# Patient Record
Sex: MALE | HISPANIC OR LATINO
[De-identification: names, ages, dates, MRNs, and addresses within clinical notes are randomized per-mention and may not be internally consistent; named-entity substitution may affect disease eponyms.]

---

## 2017-01-04 ENCOUNTER — APPOINTMENT (OUTPATIENT)
Dept: PAIN MANAGEMENT | Facility: CLINIC | Age: 52
End: 2017-01-04

## 2017-01-04 ENCOUNTER — MEDICATION RENEWAL (OUTPATIENT)
Age: 52
End: 2017-01-04

## 2017-01-06 ENCOUNTER — APPOINTMENT (OUTPATIENT)
Age: 52
End: 2017-01-06

## 2017-01-06 VITALS
BODY MASS INDEX: 28.31 KG/M2 | HEIGHT: 70.5 IN | WEIGHT: 200 LBS | HEART RATE: 64 BPM | SYSTOLIC BLOOD PRESSURE: 121 MMHG | DIASTOLIC BLOOD PRESSURE: 83 MMHG

## 2017-02-10 ENCOUNTER — MEDICATION RENEWAL (OUTPATIENT)
Age: 52
End: 2017-02-10

## 2017-03-09 ENCOUNTER — RX RENEWAL (OUTPATIENT)
Age: 52
End: 2017-03-09

## 2017-03-09 ENCOUNTER — MEDICATION RENEWAL (OUTPATIENT)
Age: 52
End: 2017-03-09

## 2017-04-05 ENCOUNTER — APPOINTMENT (OUTPATIENT)
Dept: PAIN MANAGEMENT | Facility: CLINIC | Age: 52
End: 2017-04-05

## 2017-04-05 VITALS
DIASTOLIC BLOOD PRESSURE: 86 MMHG | SYSTOLIC BLOOD PRESSURE: 134 MMHG | WEIGHT: 205 LBS | HEART RATE: 63 BPM | BODY MASS INDEX: 29.02 KG/M2 | HEIGHT: 70.5 IN

## 2017-04-12 ENCOUNTER — MEDICATION RENEWAL (OUTPATIENT)
Age: 52
End: 2017-04-12

## 2017-05-02 ENCOUNTER — MEDICATION RENEWAL (OUTPATIENT)
Age: 52
End: 2017-05-02

## 2017-05-02 ENCOUNTER — OTHER (OUTPATIENT)
Age: 52
End: 2017-05-02

## 2017-06-27 ENCOUNTER — MEDICATION RENEWAL (OUTPATIENT)
Age: 52
End: 2017-06-27

## 2017-08-02 ENCOUNTER — APPOINTMENT (OUTPATIENT)
Dept: PAIN MANAGEMENT | Facility: CLINIC | Age: 52
End: 2017-08-02
Payer: COMMERCIAL

## 2017-08-02 VITALS
WEIGHT: 197 LBS | SYSTOLIC BLOOD PRESSURE: 115 MMHG | HEIGHT: 70.5 IN | DIASTOLIC BLOOD PRESSURE: 75 MMHG | HEART RATE: 65 BPM | BODY MASS INDEX: 27.89 KG/M2

## 2017-08-02 PROCEDURE — 99214 OFFICE O/P EST MOD 30 MIN: CPT

## 2017-08-29 ENCOUNTER — MEDICATION RENEWAL (OUTPATIENT)
Age: 52
End: 2017-08-29

## 2017-09-25 ENCOUNTER — MEDICATION RENEWAL (OUTPATIENT)
Age: 52
End: 2017-09-25

## 2017-10-05 ENCOUNTER — MEDICATION RENEWAL (OUTPATIENT)
Age: 52
End: 2017-10-05

## 2017-11-07 ENCOUNTER — APPOINTMENT (OUTPATIENT)
Dept: PAIN MANAGEMENT | Facility: CLINIC | Age: 52
End: 2017-11-07
Payer: COMMERCIAL

## 2017-11-07 VITALS
SYSTOLIC BLOOD PRESSURE: 133 MMHG | BODY MASS INDEX: 28.17 KG/M2 | HEIGHT: 70.5 IN | DIASTOLIC BLOOD PRESSURE: 86 MMHG | WEIGHT: 199 LBS | HEART RATE: 59 BPM

## 2017-11-07 PROCEDURE — 99213 OFFICE O/P EST LOW 20 MIN: CPT

## 2017-12-08 ENCOUNTER — MEDICATION RENEWAL (OUTPATIENT)
Age: 52
End: 2017-12-08

## 2018-01-05 ENCOUNTER — MOBILE ON CALL (OUTPATIENT)
Age: 53
End: 2018-01-05

## 2018-01-29 ENCOUNTER — RX RENEWAL (OUTPATIENT)
Age: 53
End: 2018-01-29

## 2018-02-01 ENCOUNTER — MEDICATION RENEWAL (OUTPATIENT)
Age: 53
End: 2018-02-01

## 2018-02-26 ENCOUNTER — APPOINTMENT (OUTPATIENT)
Dept: PAIN MANAGEMENT | Facility: CLINIC | Age: 53
End: 2018-02-26
Payer: COMMERCIAL

## 2018-02-26 VITALS
HEIGHT: 70.5 IN | SYSTOLIC BLOOD PRESSURE: 141 MMHG | BODY MASS INDEX: 28.6 KG/M2 | WEIGHT: 202 LBS | DIASTOLIC BLOOD PRESSURE: 92 MMHG | HEART RATE: 68 BPM

## 2018-02-26 PROCEDURE — 99213 OFFICE O/P EST LOW 20 MIN: CPT

## 2018-03-28 ENCOUNTER — RX RENEWAL (OUTPATIENT)
Age: 53
End: 2018-03-28

## 2018-04-06 ENCOUNTER — MEDICATION RENEWAL (OUTPATIENT)
Age: 53
End: 2018-04-06

## 2018-04-25 ENCOUNTER — RX RENEWAL (OUTPATIENT)
Age: 53
End: 2018-04-25

## 2018-05-02 ENCOUNTER — RX RENEWAL (OUTPATIENT)
Age: 53
End: 2018-05-02

## 2018-05-02 ENCOUNTER — MEDICATION RENEWAL (OUTPATIENT)
Age: 53
End: 2018-05-02

## 2018-05-17 ENCOUNTER — APPOINTMENT (OUTPATIENT)
Dept: PAIN MANAGEMENT | Facility: CLINIC | Age: 53
End: 2018-05-17
Payer: COMMERCIAL

## 2018-05-17 VITALS
WEIGHT: 202 LBS | HEART RATE: 66 BPM | HEIGHT: 70.5 IN | BODY MASS INDEX: 28.6 KG/M2 | SYSTOLIC BLOOD PRESSURE: 146 MMHG | DIASTOLIC BLOOD PRESSURE: 86 MMHG

## 2018-05-17 PROCEDURE — 99213 OFFICE O/P EST LOW 20 MIN: CPT

## 2018-06-26 ENCOUNTER — RX RENEWAL (OUTPATIENT)
Age: 53
End: 2018-06-26

## 2018-06-29 ENCOUNTER — MESSAGE (OUTPATIENT)
Age: 53
End: 2018-06-29

## 2018-07-26 ENCOUNTER — MEDICATION RENEWAL (OUTPATIENT)
Age: 53
End: 2018-07-26

## 2018-08-15 ENCOUNTER — MEDICATION RENEWAL (OUTPATIENT)
Age: 53
End: 2018-08-15

## 2018-08-15 ENCOUNTER — APPOINTMENT (OUTPATIENT)
Dept: PAIN MANAGEMENT | Facility: CLINIC | Age: 53
End: 2018-08-15
Payer: COMMERCIAL

## 2018-08-15 VITALS
SYSTOLIC BLOOD PRESSURE: 164 MMHG | BODY MASS INDEX: 28.92 KG/M2 | HEART RATE: 65 BPM | WEIGHT: 202 LBS | DIASTOLIC BLOOD PRESSURE: 82 MMHG | HEIGHT: 70 IN

## 2018-08-15 PROCEDURE — 99214 OFFICE O/P EST MOD 30 MIN: CPT

## 2018-09-17 ENCOUNTER — MEDICATION RENEWAL (OUTPATIENT)
Age: 53
End: 2018-09-17

## 2018-10-19 ENCOUNTER — RX RENEWAL (OUTPATIENT)
Age: 53
End: 2018-10-19

## 2018-10-30 ENCOUNTER — MEDICATION RENEWAL (OUTPATIENT)
Age: 53
End: 2018-10-30

## 2018-11-02 ENCOUNTER — RX RENEWAL (OUTPATIENT)
Age: 53
End: 2018-11-02

## 2018-11-12 ENCOUNTER — APPOINTMENT (OUTPATIENT)
Dept: PAIN MANAGEMENT | Facility: CLINIC | Age: 53
End: 2018-11-12
Payer: COMMERCIAL

## 2018-11-12 VITALS
HEIGHT: 70 IN | WEIGHT: 200 LBS | DIASTOLIC BLOOD PRESSURE: 84 MMHG | HEART RATE: 60 BPM | SYSTOLIC BLOOD PRESSURE: 138 MMHG | BODY MASS INDEX: 28.63 KG/M2

## 2018-11-12 PROCEDURE — 99213 OFFICE O/P EST LOW 20 MIN: CPT

## 2018-12-12 ENCOUNTER — MEDICATION RENEWAL (OUTPATIENT)
Age: 53
End: 2018-12-12

## 2018-12-12 ENCOUNTER — RX RENEWAL (OUTPATIENT)
Age: 53
End: 2018-12-12

## 2018-12-26 ENCOUNTER — MESSAGE (OUTPATIENT)
Age: 53
End: 2018-12-26

## 2019-01-11 ENCOUNTER — MEDICATION RENEWAL (OUTPATIENT)
Age: 54
End: 2019-01-11

## 2019-02-05 ENCOUNTER — APPOINTMENT (OUTPATIENT)
Dept: PAIN MANAGEMENT | Facility: CLINIC | Age: 54
End: 2019-02-05
Payer: COMMERCIAL

## 2019-02-05 VITALS
BODY MASS INDEX: 27.2 KG/M2 | HEIGHT: 70 IN | DIASTOLIC BLOOD PRESSURE: 85 MMHG | SYSTOLIC BLOOD PRESSURE: 145 MMHG | HEART RATE: 72 BPM | WEIGHT: 190 LBS

## 2019-02-05 PROCEDURE — 99213 OFFICE O/P EST LOW 20 MIN: CPT

## 2019-02-05 NOTE — ASSESSMENT
[Opioids] : Patient was explained in detail about pain control by using opioids. Patient has signed and fully understands our guidelines for medication and drug screening.  Patient understands the side effects of opioids, including, but not limited to, drug tolerance, dependence, potential for addiction. This class of drugs is habit-forming and EHSAN regulated. The sedative effects of opioids can be potentiated by taking alcohol or any sleeping pills, along with opioids. The decision to drive is patient’s responsibility, as opioids can affect his/her driving ability and ability to concentrate. The long-term place is not clear, however, patient understands that once the pain control optimizes, the goal will be to wean off the opioids. All the issues regarding opioid treatment have been addressed satisfactorily.

## 2019-02-07 NOTE — DISCUSSION/SUMMARY
[Opioids] : Patient was explained in detail about pain control by using opioids. Patient has signed and fully understands our guidelines for medication and drug screening.  Patient understands the side effects of opioids, including, but not limited to, drug tolerance, dependence, potential for addiction. This class of drugs is habit-forming and EHSAN regulated. The sedative effects of opioids can be potentiated by taking alcohol or any sleeping pills, along with opioids. The decision to drive is patient’s responsibility, as opioids can affect his/her driving ability and ability to concentrate. The long-term place is not clear, however, patient understands that once the pain control optimizes, the goal will be to wean off the opioids. All the issues regarding opioid treatment have been addressed satisfactorily.  [FreeTextEntry1] : His istop was checked # [id number].  No signs of toxicity.  Will continue to monitor. His prescriptions were renewed without changes.  He was reminded of the risks of long term opioid use. \par \par He will call his pcp for an appt today.  The importance of doing so re-inforced. \par \par He appears to be using his medications both reasonably and responsibly.  His medication dosages and schedules were reviewed and appropriate.  We reviewed his opiate agreement and discussed locked box for his medications.   He shows no signs of aberrant behavior.  \par \par He will RTO in two months time for reevaluation.  \par

## 2019-02-07 NOTE — PHYSICAL EXAM
[General Appearance - Alert] : alert [General Appearance - In No Acute Distress] : in no acute distress [General Appearance - Well Nourished] : well nourished [General Appearance - Well Developed] : well developed [General Appearance - Well-Appearing] : healthy appearing [Oriented To Time, Place, And Person] : oriented to person, place, and time [Impaired Insight] : insight and judgment were intact [Affect] : the affect was normal [Mood] : the mood was normal [Memory Recent] : recent memory was not impaired [Memory Remote] : remote memory was not impaired [Motor Strength] : muscle strength was normal in all four extremities [No Muscle Atrophy] : normal bulk in all four extremities [Sensation Tactile Decrease] : light touch was intact [Balance] : balance was intact [Respiration, Rhythm And Depth] : normal respiratory rhythm and effort [Exaggerated Use Of Accessory Muscles For Inspiration] : no accessory muscle use [Abdomen Soft] : soft [Abdomen Tenderness] : non-tender [Abdomen Mass (___ Cm)] : no abdominal mass palpated [No Spinal Tenderness] : no spinal tenderness [Abnormal Walk] : normal gait [Nail Clubbing] : no clubbing  or cyanosis of the fingernails [Involuntary Movements] : no involuntary movements were seen [Musculoskeletal - Swelling] : no joint swelling seen [Motor Tone] : muscle strength and tone were normal [Skin Color & Pigmentation] : normal skin color and pigmentation [] : no rash [Skin Lesions] : no skin lesions [FreeTextEntry8] : no assistive device

## 2019-02-07 NOTE — HISTORY OF PRESENT ILLNESS
[FreeTextEntry1] : The patient returned today for a follow up ov.  He was alone today.  He reports that his pain continues to be controlled, both decrease pain and increase functioning with his medication.  VAS=6/10 with his medications.\par \par He is continues to work full time-no missed work days. \par \par He denies side effects to his medications- BM regular. \par \par /85.  \par \par He fully denies depression and suicidal ideations upon full questioning.  \par \par He is complaining of fatigue and weight loss over the past couple of months.  He states that he has never been below 200 lbs since [employer].  He has not seen his pcp yet.  No other medical complaints.

## 2019-03-04 ENCOUNTER — MEDICATION RENEWAL (OUTPATIENT)
Age: 54
End: 2019-03-04

## 2019-04-01 ENCOUNTER — RX RENEWAL (OUTPATIENT)
Age: 54
End: 2019-04-01

## 2019-04-02 ENCOUNTER — RX RENEWAL (OUTPATIENT)
Age: 54
End: 2019-04-02

## 2019-04-04 ENCOUNTER — MEDICATION RENEWAL (OUTPATIENT)
Age: 54
End: 2019-04-04

## 2019-04-30 ENCOUNTER — APPOINTMENT (OUTPATIENT)
Dept: PAIN MANAGEMENT | Facility: CLINIC | Age: 54
End: 2019-04-30
Payer: COMMERCIAL

## 2019-04-30 VITALS
HEART RATE: 61 BPM | SYSTOLIC BLOOD PRESSURE: 104 MMHG | BODY MASS INDEX: 27.2 KG/M2 | WEIGHT: 190 LBS | HEIGHT: 70 IN | DIASTOLIC BLOOD PRESSURE: 64 MMHG

## 2019-04-30 PROCEDURE — 99213 OFFICE O/P EST LOW 20 MIN: CPT

## 2019-05-02 NOTE — DISCUSSION/SUMMARY
[Opioids] : Patient was explained in detail about pain control by using opioids. Patient has signed and fully understands our guidelines for medication and drug screening.  Patient understands the side effects of opioids, including, but not limited to, drug tolerance, dependence, potential for addiction. This class of drugs is habit-forming and EHSAN regulated. The sedative effects of opioids can be potentiated by taking alcohol or any sleeping pills, along with opioids. The decision to drive is patient’s responsibility, as opioids can affect his/her driving ability and ability to concentrate. The long-term place is not clear, however, patient understands that once the pain control optimizes, the goal will be to wean off the opioids. All the issues regarding opioid treatment have been addressed satisfactorily.  [FreeTextEntry1] : Istop checked (#[id number]).  No signs of toxicity.  Will continue to monitor. His prescriptions were renewed. He was reminded of the risks of long term opioid use.  We also discussed other alternatives which he defers at this jayce.   \par \par He was reminded to f/u with his pcp due to recent weight loss.   \par \par No aberrant behavior noted.  He appears to be using his medications both reasonably and responsibly.  We reviewed his opiate agreement and discussed locked box for his medications. No reported side effects-bm regular.  \par \par He will RTO in three months time for reevaluation.  \par

## 2019-05-02 NOTE — HISTORY OF PRESENT ILLNESS
[FreeTextEntry1] : The patient returned today for a follow up ov.  His pain continues to be under control with his current medication regimen.  He is requesting renewals.  VAS=5-6/10 with his medications, providing both decrease pain and increase functioning.\par \par He works full time and has no missed work days. \par \par /64.  \par \par He fully denies depression and suicidal ideations upon full questioning.  \par \par No other medical complaints.

## 2019-05-02 NOTE — REASON FOR VISIT
[Follow-Up: _____] : a [unfilled] follow-up visit [FreeTextEntry1] : chronic bilateral lower back pain/buttocks pain with radiation to bilateral legs with prolonged sit.

## 2019-05-29 ENCOUNTER — MEDICATION RENEWAL (OUTPATIENT)
Age: 54
End: 2019-05-29

## 2019-05-31 ENCOUNTER — MEDICATION RENEWAL (OUTPATIENT)
Age: 54
End: 2019-05-31

## 2019-06-25 ENCOUNTER — RX RENEWAL (OUTPATIENT)
Age: 54
End: 2019-06-25

## 2019-06-26 ENCOUNTER — RX RENEWAL (OUTPATIENT)
Age: 54
End: 2019-06-26

## 2019-07-03 ENCOUNTER — MEDICATION RENEWAL (OUTPATIENT)
Age: 54
End: 2019-07-03

## 2019-07-08 ENCOUNTER — MEDICATION RENEWAL (OUTPATIENT)
Age: 54
End: 2019-07-08

## 2019-07-29 ENCOUNTER — APPOINTMENT (OUTPATIENT)
Dept: PAIN MANAGEMENT | Facility: CLINIC | Age: 54
End: 2019-07-29
Payer: COMMERCIAL

## 2019-07-29 VITALS
DIASTOLIC BLOOD PRESSURE: 72 MMHG | WEIGHT: 185 LBS | HEART RATE: 65 BPM | SYSTOLIC BLOOD PRESSURE: 119 MMHG | BODY MASS INDEX: 26.48 KG/M2 | HEIGHT: 70 IN

## 2019-07-29 PROCEDURE — 99213 OFFICE O/P EST LOW 20 MIN: CPT

## 2019-08-26 ENCOUNTER — MEDICATION RENEWAL (OUTPATIENT)
Age: 54
End: 2019-08-26

## 2019-08-29 ENCOUNTER — MEDICATION RENEWAL (OUTPATIENT)
Age: 54
End: 2019-08-29

## 2019-10-22 ENCOUNTER — APPOINTMENT (OUTPATIENT)
Dept: PAIN MANAGEMENT | Facility: CLINIC | Age: 54
End: 2019-10-22
Payer: COMMERCIAL

## 2019-10-22 VITALS
DIASTOLIC BLOOD PRESSURE: 64 MMHG | WEIGHT: 187 LBS | HEIGHT: 70 IN | SYSTOLIC BLOOD PRESSURE: 113 MMHG | HEART RATE: 66 BPM | BODY MASS INDEX: 26.77 KG/M2

## 2019-10-22 PROCEDURE — 99213 OFFICE O/P EST LOW 20 MIN: CPT

## 2019-10-29 NOTE — DISCUSSION/SUMMARY
[Opioids] : Patient was explained in detail about pain control by using opioids. Patient has signed and fully understands our guidelines for medication and drug screening.  Patient understands the side effects of opioids, including, but not limited to, drug tolerance, dependence, potential for addiction. This class of drugs is habit-forming and EHSAN regulated. The sedative effects of opioids can be potentiated by taking alcohol or any sleeping pills, along with opioids. The decision to drive is patient’s responsibility, as opioids can affect his/her driving ability and ability to concentrate. The long-term place is not clear, however, patient understands that once the pain control optimizes, the goal will be to wean off the opioids. All the issues regarding opioid treatment have been addressed satisfactorily.  [FreeTextEntry1] : [facility] registry reviewed (#[id number]). No signs of toxicity.  Will continue to monitor. His prescriptions were renewed without changes for now.  I reviewed the risks of long term opioid use.   \par \par He will continue to pursue the CT scan of his lungs to rule out asbestosis exposure as ordered by his pcp.   \par \par No aberrant behavior noted.  He appears to be using his medications both reasonably and responsibly.  We reviewed his opiate agreement and discussed locked box for his medications. No reported side effects.  \par \par He will RTO in three months time for reevaluation.  \par \par Dr. Hall on site.  Billed incident to the service.

## 2019-10-29 NOTE — HISTORY OF PRESENT ILLNESS
[FreeTextEntry1] : Mr. Conway returned today for a follow up office visit.  He came alone to his appointment today. He continues under our care for treatment of his chronic lower back pain with radiation to his bilateral legs.     He states that his pain has been relatively stable, responds to his pain medications.  That is he continues to receive decrease pain and increase functioning.  VAS=4-6/10 on average with his medications. \par \par He works full time and has no missed work days. \par \par /64. He has had unintentional weight loss.  He was seen by his pcp for physical due to his weight loss.  He states that labs were drawn and his pcp requested a CT scan of his lungs to rule out asbestos exposure.  However, his insurance carrier will not authorize it.  He did gain 2 lbs since his last office visit with me in July.  Given his recent weight loss and his history of construction work I feel that the CT Scan is medically necessary.    \par \par No complaints of depression/suicidal thoughts.  \par \par No other medical complaints.

## 2019-11-18 ENCOUNTER — MEDICATION RENEWAL (OUTPATIENT)
Age: 54
End: 2019-11-18

## 2019-12-16 ENCOUNTER — MEDICATION RENEWAL (OUTPATIENT)
Age: 54
End: 2019-12-16

## 2019-12-18 ENCOUNTER — MEDICATION RENEWAL (OUTPATIENT)
Age: 54
End: 2019-12-18

## 2019-12-19 ENCOUNTER — MEDICATION RENEWAL (OUTPATIENT)
Age: 54
End: 2019-12-19

## 2020-01-10 ENCOUNTER — MEDICATION RENEWAL (OUTPATIENT)
Age: 55
End: 2020-01-10

## 2020-01-10 RX ORDER — TIZANIDINE HYDROCHLORIDE 4 MG/1
4 CAPSULE ORAL
Qty: 180 | Refills: 2 | Status: DISCONTINUED | COMMUNITY
Start: 2018-11-12 | End: 2020-01-10

## 2020-02-10 ENCOUNTER — APPOINTMENT (OUTPATIENT)
Dept: PAIN MANAGEMENT | Facility: CLINIC | Age: 55
End: 2020-02-10
Payer: COMMERCIAL

## 2020-02-10 VITALS
HEART RATE: 65 BPM | BODY MASS INDEX: 27.92 KG/M2 | HEIGHT: 70 IN | DIASTOLIC BLOOD PRESSURE: 90 MMHG | SYSTOLIC BLOOD PRESSURE: 138 MMHG | WEIGHT: 195 LBS

## 2020-02-10 DIAGNOSIS — R52 PAIN, UNSPECIFIED: ICD-10-CM

## 2020-02-10 PROCEDURE — 99213 OFFICE O/P EST LOW 20 MIN: CPT

## 2020-02-12 NOTE — PHYSICAL EXAM
[General Appearance - Alert] : alert [General Appearance - In No Acute Distress] : in no acute distress [General Appearance - Well-Appearing] : healthy appearing [General Appearance - Well Nourished] : well nourished [General Appearance - Well Developed] : well developed [Impaired Insight] : insight and judgment were intact [Oriented To Time, Place, And Person] : oriented to person, place, and time [Memory Remote] : remote memory was not impaired [Affect] : the affect was normal [Memory Recent] : recent memory was not impaired [Mood] : the mood was normal [Motor Strength] : muscle strength was normal in all four extremities [Sensation Tactile Decrease] : light touch was intact [No Muscle Atrophy] : normal bulk in all four extremities [Balance] : balance was intact [FreeTextEntry8] : no assistive device [Respiration, Rhythm And Depth] : normal respiratory rhythm and effort [Exaggerated Use Of Accessory Muscles For Inspiration] : no accessory muscle use [Abdomen Tenderness] : non-tender [Abdomen Mass (___ Cm)] : no abdominal mass palpated [Abdomen Soft] : soft [No Spinal Tenderness] : no spinal tenderness [Abnormal Walk] : normal gait [Nail Clubbing] : no clubbing  or cyanosis of the fingernails [Musculoskeletal - Swelling] : no joint swelling seen [Motor Tone] : muscle strength and tone were normal [Involuntary Movements] : no involuntary movements were seen [Skin Color & Pigmentation] : normal skin color and pigmentation [Skin Lesions] : no skin lesions [] : no rash

## 2020-02-12 NOTE — HISTORY OF PRESENT ILLNESS
[FreeTextEntry1] : Mr. Conway returned today for a follow up office visit.  He came alone to his appointment today. He continues under our care for treatment of his chronic lower back pain with radiation to his bilateral legs.     He states that his pain has been worse lately.  He hurts all over.  He states that he was bit by a spider or a tick several months back when he was at his [place] [facility].  He feels that ever since his pain came on gradually.  VAS=6-8/10 due to body pain. \par \par He works full time and has no missed work days. \par \par /90. He has also had unintentional weight loss. He is going to call his pcp to follow up for labs today as well. \par \par No complaints of depression/suicidal thoughts.  \par \par No other medical complaints.

## 2020-02-12 NOTE — DISCUSSION/SUMMARY
[FreeTextEntry1] :  registry reviewed. No signs of toxicity.  Will continue to monitor. His prescriptions were renewed without changes for now.  I reviewed the risks of long term opioid use.   \par \par Labs ordered.  He will call his pcp today to schedule an appt so that he can see if additional tests are needed. .  \par \par No aberrant behavior noted.  He appears to be using his medications both reasonably and responsibly.  We reviewed his opiate agreement and discussed locked box for his medications. No reported side effects.  \par \par He will RTO in three months time for reevaluation.  \par \par Dr. Hall on site.  Billed incident to the service.  [Opioids] : Patient was explained in detail about pain control by using opioids. Patient has signed and fully understands our guidelines for medication and drug screening.  Patient understands the side effects of opioids, including, but not limited to, drug tolerance, dependence, potential for addiction. This class of drugs is habit-forming and EHSAN regulated. The sedative effects of opioids can be potentiated by taking alcohol or any sleeping pills, along with opioids. The decision to drive is patient’s responsibility, as opioids can affect his/her driving ability and ability to concentrate. The long-term place is not clear, however, patient understands that once the pain control optimizes, the goal will be to wean off the opioids. All the issues regarding opioid treatment have been addressed satisfactorily.

## 2020-05-01 ENCOUNTER — APPOINTMENT (OUTPATIENT)
Dept: PAIN MANAGEMENT | Facility: CLINIC | Age: 55
End: 2020-05-01

## 2020-06-25 ENCOUNTER — APPOINTMENT (OUTPATIENT)
Dept: PAIN MANAGEMENT | Facility: CLINIC | Age: 55
End: 2020-06-25
Payer: COMMERCIAL

## 2020-06-25 VITALS
SYSTOLIC BLOOD PRESSURE: 120 MMHG | BODY MASS INDEX: 27.92 KG/M2 | HEIGHT: 70 IN | WEIGHT: 195 LBS | HEART RATE: 67 BPM | DIASTOLIC BLOOD PRESSURE: 78 MMHG

## 2020-06-25 VITALS — TEMPERATURE: 97.2 F

## 2020-06-25 PROCEDURE — 99213 OFFICE O/P EST LOW 20 MIN: CPT

## 2020-06-25 NOTE — REVIEW OF SYSTEMS
[Fever] : no fever [Chills] : no chills [Chest Pain] : no chest pain [Palpitations] : no palpitations [Shortness Of Breath] : no shortness of breath [Back Pain] : ~T back pain [Depression] : no depression

## 2020-06-25 NOTE — ASSESSMENT
[FreeTextEntry1] : UDS today . \par Pt does not need a refill today ( it was sent in on 6/19/2020).\par RTo 2 months. \par DR Hall on site today.  [Opioids] : Patient was explained in detail about pain control by using opioids. Patient has signed and fully understands our guidelines for medication and drug screening.  Patient understands the side effects of opioids, including, but not limited to, drug tolerance, dependence, potential for addiction. This class of drugs is habit-forming and EHSAN regulated. The sedative effects of opioids can be potentiated by taking alcohol or any sleeping pills, along with opioids. The decision to drive is patient’s responsibility, as opioids can affect his/her driving ability and ability to concentrate. The long-term place is not clear, however, patient understands that once the pain control optimizes, the goal will be to wean off the opioids. All the issues regarding opioid treatment have been addressed satisfactorily.

## 2020-09-23 ENCOUNTER — APPOINTMENT (OUTPATIENT)
Dept: PAIN MANAGEMENT | Facility: CLINIC | Age: 55
End: 2020-09-23
Payer: COMMERCIAL

## 2020-09-23 VITALS
HEIGHT: 70 IN | WEIGHT: 190 LBS | SYSTOLIC BLOOD PRESSURE: 121 MMHG | BODY MASS INDEX: 27.2 KG/M2 | DIASTOLIC BLOOD PRESSURE: 78 MMHG | HEART RATE: 66 BPM | TEMPERATURE: 97.2 F

## 2020-09-23 PROCEDURE — 99213 OFFICE O/P EST LOW 20 MIN: CPT

## 2020-09-23 NOTE — ASSESSMENT
[FreeTextEntry1] : ISTOP # [id number].\par Next appt should be with Dr Hall . \par \par \par Dr Hall on site , billed incident to service.  [Opioids] : Patient was explained in detail about pain control by using opioids. Patient has signed and fully understands our guidelines for medication and drug screening.  Patient understands the side effects of opioids, including, but not limited to, drug tolerance, dependence, potential for addiction. This class of drugs is habit-forming and EHSAN regulated. The sedative effects of opioids can be potentiated by taking alcohol or any sleeping pills, along with opioids. The decision to drive is patient’s responsibility, as opioids can affect his/her driving ability and ability to concentrate. The long-term place is not clear, however, patient understands that once the pain control optimizes, the goal will be to wean off the opioids. All the issues regarding opioid treatment have been addressed satisfactorily.

## 2020-09-23 NOTE — PHYSICAL EXAM
[General Appearance - Alert] : alert [General Appearance - In No Acute Distress] : in no acute distress [General Appearance - Well-Appearing] : healthy appearing [] : normal voice and communication [Oriented To Time, Place, And Person] : oriented to person, place, and time [Affect] : the affect was normal [Mood] : the mood was normal [Motor Strength] : muscle strength was normal in all four extremities [Motor Strength Lower Extremities Bilaterally] : strength was normal in both lower extremities [Sclera] : the sclera and conjunctiva were normal

## 2020-09-23 NOTE — HISTORY OF PRESENT ILLNESS
[FreeTextEntry1] : Patient returns today for a follow up visit. \par He continues to have lower back pain .\par He is taking Vicodin . No signs of toxicity noted .\par UDS reviewed = positive for THC - pt educated about dangers of of non- medical marijuana. Ot advised to stop using marijuana. \par \par Pt denies alcohol use . Pt denies depression . He reports he keep medication in a sake location. \par \par

## 2020-11-18 ENCOUNTER — RX RENEWAL (OUTPATIENT)
Age: 55
End: 2020-11-18

## 2020-12-12 NOTE — REASON FOR VISIT
Yes... [Follow-Up: _____] : a [unfilled] follow-up visit [FreeTextEntry1] : chronic bilateral lower back pain/buttocks pain with radiation to bilateral legs with prolonged sit.

## 2020-12-15 ENCOUNTER — APPOINTMENT (OUTPATIENT)
Dept: PAIN MANAGEMENT | Facility: CLINIC | Age: 55
End: 2020-12-15
Payer: COMMERCIAL

## 2020-12-15 VITALS
WEIGHT: 190 LBS | HEIGHT: 70 IN | BODY MASS INDEX: 27.2 KG/M2 | HEART RATE: 58 BPM | DIASTOLIC BLOOD PRESSURE: 79 MMHG | SYSTOLIC BLOOD PRESSURE: 144 MMHG

## 2020-12-15 PROCEDURE — 99214 OFFICE O/P EST MOD 30 MIN: CPT

## 2020-12-15 PROCEDURE — 99072 ADDL SUPL MATRL&STAF TM PHE: CPT

## 2020-12-15 NOTE — HISTORY OF PRESENT ILLNESS
[FreeTextEntry1] : Patient reports vicoprofen is almost like taking ibupofen alone. Last took this am. No new medical problems.\par Went for MRI last week of LS spine

## 2020-12-15 NOTE — ASSESSMENT
[FreeTextEntry1] : Chronic LS radiculopathy\par \par Will lori we  Vicoprofen to 3 p daty and re evaluate.\par Pending MRI results\par ISTOP and UDT

## 2021-02-08 ENCOUNTER — APPOINTMENT (OUTPATIENT)
Dept: PAIN MANAGEMENT | Facility: CLINIC | Age: 56
End: 2021-02-08
Payer: COMMERCIAL

## 2021-02-08 VITALS
SYSTOLIC BLOOD PRESSURE: 111 MMHG | BODY MASS INDEX: 27.77 KG/M2 | HEART RATE: 72 BPM | WEIGHT: 194 LBS | HEIGHT: 70 IN | DIASTOLIC BLOOD PRESSURE: 65 MMHG

## 2021-02-08 PROCEDURE — 99212 OFFICE O/P EST SF 10 MIN: CPT

## 2021-02-08 PROCEDURE — 99072 ADDL SUPL MATRL&STAF TM PHE: CPT

## 2021-02-08 RX ORDER — METOPROLOL SUCCINATE 25 MG/1
25 TABLET, EXTENDED RELEASE ORAL
Qty: 30 | Refills: 0 | Status: ACTIVE | COMMUNITY
Start: 2020-12-09

## 2021-02-08 RX ORDER — AMLODIPINE BESYLATE 10 MG/1
10 TABLET ORAL
Qty: 30 | Refills: 0 | Status: ACTIVE | COMMUNITY
Start: 2020-12-09

## 2021-02-08 RX ORDER — ALPRAZOLAM 0.25 MG/1
0.25 TABLET ORAL
Refills: 0 | Status: ACTIVE | COMMUNITY
Start: 2021-02-08

## 2021-02-08 NOTE — HISTORY OF PRESENT ILLNESS
[FreeTextEntry1] : Pt returns today for a followup visit. Continues to have lower back pain. \par Unsure if if had a recent lumbar spine MRI .\par Denies any weakness to legs.\par Denies dysfunction to bladder or bowel. \par \par Pt denies alcohol use. \par No signs of toxicity noted. \par ISTOP # [id number]. \par \par

## 2021-02-08 NOTE — PHYSICAL EXAM
[General Appearance - Alert] : alert [General Appearance - In No Acute Distress] : in no acute distress [General Appearance - Well-Appearing] : healthy appearing [] : normal voice and communication [Oriented To Time, Place, And Person] : oriented to person, place, and time [Affect] : the affect was normal [Mood] : the mood was normal [Motor Strength] : muscle strength was normal in all four extremities [PERRL With Normal Accommodation] : pupils were equal in size, round, reactive to light, with normal accommodation [Sclera] : the sclera and conjunctiva were normal

## 2021-02-08 NOTE — ASSESSMENT
[FreeTextEntry1] : No changes today \par MRI lumbar spine order put in. \par I will continue to montor for ISTOP and toxicity.  [Opioids] : Patient was explained in detail about pain control by using opioids. Patient has signed and fully understands our guidelines for medication and drug screening.  Patient understands the side effects of opioids, including, but not limited to, drug tolerance, dependence, potential for addiction. This class of drugs is habit-forming and EHSAN regulated. The sedative effects of opioids can be potentiated by taking alcohol or any sleeping pills, along with opioids. The decision to drive is patient’s responsibility, as opioids can affect his/her driving ability and ability to concentrate. The long-term place is not clear, however, patient understands that once the pain control optimizes, the goal will be to wean off the opioids. All the issues regarding opioid treatment have been addressed satisfactorily.

## 2021-03-16 ENCOUNTER — RX RENEWAL (OUTPATIENT)
Age: 56
End: 2021-03-16

## 2021-04-13 ENCOUNTER — APPOINTMENT (OUTPATIENT)
Dept: PAIN MANAGEMENT | Facility: CLINIC | Age: 56
End: 2021-04-13
Payer: COMMERCIAL

## 2021-04-13 VITALS
HEIGHT: 70 IN | BODY MASS INDEX: 27.77 KG/M2 | WEIGHT: 194 LBS | SYSTOLIC BLOOD PRESSURE: 151 MMHG | DIASTOLIC BLOOD PRESSURE: 88 MMHG | HEART RATE: 67 BPM

## 2021-04-13 VITALS — TEMPERATURE: 96.4 F

## 2021-04-13 VITALS — TEMPERATURE: 96.2 F

## 2021-04-13 PROCEDURE — 99212 OFFICE O/P EST SF 10 MIN: CPT

## 2021-04-13 PROCEDURE — 99072 ADDL SUPL MATRL&STAF TM PHE: CPT

## 2021-04-13 NOTE — HISTORY OF PRESENT ILLNESS
[FreeTextEntry1] : ISTOP # [id number]\par Pt returns today for a follow up visit. \par Pt continues to have lower back pain . Pain can radiate to legs and lower abdomen. \par Pt continues to take Vicoden  3x/ day. No signs of toxicity today. \par UDS reviewed + for THC - pt advised to avoid "street marijuana"/ \par Pt reminded to avoid alcohol .\par \par MRI Lumbar spine denied by insurance - of note pt has tried PT in the past and NSAIDs for at least 2 weeks

## 2021-04-13 NOTE — ASSESSMENT
[FreeTextEntry1] : UDS today \par I will continue to try to get MRI approved ( L- spine ) . \par \par Dr Hall on site , billed incident to service.  [Opioids] : Patient was explained in detail about pain control by using opioids. Patient has signed and fully understands our guidelines for medication and drug screening.  Patient understands the side effects of opioids, including, but not limited to, drug tolerance, dependence, potential for addiction. This class of drugs is habit-forming and EHSAN regulated. The sedative effects of opioids can be potentiated by taking alcohol or any sleeping pills, along with opioids. The decision to drive is patient’s responsibility, as opioids can affect his/her driving ability and ability to concentrate. The long-term place is not clear, however, patient understands that once the pain control optimizes, the goal will be to wean off the opioids. All the issues regarding opioid treatment have been addressed satisfactorily.

## 2021-04-16 ENCOUNTER — RX RENEWAL (OUTPATIENT)
Age: 56
End: 2021-04-16

## 2021-05-27 ENCOUNTER — RX RENEWAL (OUTPATIENT)
Age: 56
End: 2021-05-27

## 2021-06-15 ENCOUNTER — APPOINTMENT (OUTPATIENT)
Dept: PAIN MANAGEMENT | Facility: CLINIC | Age: 56
End: 2021-06-15
Payer: COMMERCIAL

## 2021-06-15 VITALS
HEART RATE: 59 BPM | SYSTOLIC BLOOD PRESSURE: 113 MMHG | DIASTOLIC BLOOD PRESSURE: 69 MMHG | WEIGHT: 194 LBS | HEIGHT: 70 IN | BODY MASS INDEX: 27.77 KG/M2

## 2021-06-15 PROCEDURE — 99212 OFFICE O/P EST SF 10 MIN: CPT

## 2021-06-15 PROCEDURE — 99072 ADDL SUPL MATRL&STAF TM PHE: CPT

## 2021-06-15 NOTE — PHYSICAL EXAM
[General Appearance - Alert] : alert [General Appearance - Well-Appearing] : healthy appearing [Oriented To Time, Place, And Person] : oriented to person, place, and time [Affect] : the affect was normal [Mood] : the mood was normal [Motor Strength] : muscle strength was normal in all four extremities [Paresis Pronator Drift Right-Sided] : no pronator drift on the right [Paresis Pronator Drift Left-Sided] : no pronator drift on the left [Motor Strength Upper Extremities Bilaterally] : strength was normal in both upper extremities [Motor Strength Lower Extremities Bilaterally] : strength was normal in both lower extremities [Sclera] : the sclera and conjunctiva were normal [PERRL With Normal Accommodation] : pupils were equal in size, round, reactive to light, with normal accommodation [Extraocular Movements] : extraocular movements were intact [] : no respiratory distress [Edema] : there was no peripheral edema

## 2021-06-15 NOTE — HISTORY OF PRESENT ILLNESS
[FreeTextEntry1] : ISTOP # [id number]\par Pt returns today for afollow up visit . \par Continues to have lower back pain. \par Has had a difficult time with decrease of Vicodin from 4 ta/ day to 3 tab / day . \par Discussed with patient trial of TPI for lumbar spine - pt declined.\par Also discussed medical marijuana and avoid " street marijuana" - pt declined. \par Pt denies alcohol use . \par \par Pt reports Ambien is helpful for sleep

## 2021-06-15 NOTE — ASSESSMENT
[FreeTextEntry1] : Pt reporting issues with hips not aligned - pt advised to consult with Orthopedist . \par I will continue to monitor ISTOP and for toxicity. \par RTO1 month  [Opioids] : Patient was explained in detail about pain control by using opioids. Patient has signed and fully understands our guidelines for medication and drug screening.  Patient understands the side effects of opioids, including, but not limited to, drug tolerance, dependence, potential for addiction. This class of drugs is habit-forming and EHSAN regulated. The sedative effects of opioids can be potentiated by taking alcohol or any sleeping pills, along with opioids. The decision to drive is patient’s responsibility, as opioids can affect his/her driving ability and ability to concentrate. The long-term place is not clear, however, patient understands that once the pain control optimizes, the goal will be to wean off the opioids. All the issues regarding opioid treatment have been addressed satisfactorily.

## 2021-06-15 NOTE — REVIEW OF SYSTEMS
[Fever] : no fever [Chills] : no chills [Chest Pain] : no chest pain [Palpitations] : no palpitations [Shortness Of Breath] : no shortness of breath [Back Pain] : ~T back pain [Dizziness] : no dizziness [Fainting] : no fainting

## 2021-08-12 ENCOUNTER — RX RENEWAL (OUTPATIENT)
Age: 56
End: 2021-08-12

## 2021-08-18 ENCOUNTER — APPOINTMENT (OUTPATIENT)
Dept: PAIN MANAGEMENT | Facility: CLINIC | Age: 56
End: 2021-08-18
Payer: COMMERCIAL

## 2021-08-18 PROCEDURE — 99212 OFFICE O/P EST SF 10 MIN: CPT | Mod: 95

## 2021-08-18 NOTE — ASSESSMENT
[FreeTextEntry1] : No changes today. \par I will continue to monitor ISTOP and for signs of toxicity.  \par RTO 1 month  [Opioids] : Patient was explained in detail about pain control by using opioids. Patient has signed and fully understands our guidelines for medication and drug screening.  Patient understands the side effects of opioids, including, but not limited to, drug tolerance, dependence, potential for addiction. This class of drugs is habit-forming and EHSAN regulated. The sedative effects of opioids can be potentiated by taking alcohol or any sleeping pills, along with opioids. The decision to drive is patient’s responsibility, as opioids can affect his/her driving ability and ability to concentrate. The long-term place is not clear, however, patient understands that once the pain control optimizes, the goal will be to wean off the opioids. All the issues regarding opioid treatment have been addressed satisfactorily.

## 2021-08-18 NOTE — HISTORY OF PRESENT ILLNESS
[Home] : at home, [unfilled] , at the time of the visit. [Medical Office: ([facility])___] : at the medical office located in  [Verbal consent obtained from patient] : the patient, [unfilled] [FreeTextEntry1] : istop#[id number]\par Pt returns today via TEB .Pt continues to have lower back pain for lumbar radiculopathy . \par Denies any new symptoms. \par Sleeps ~ 4 hrs/ night with Ambien. \par Mood has been stable. \par Pt remains active - working full time.

## 2021-08-18 NOTE — PHYSICAL EXAM
[General Appearance - Well-Appearing] : healthy appearing [General Appearance - Alert] : alert [Oriented To Time, Place, And Person] : oriented to person, place, and time [Affect] : the affect was normal [Mood] : the mood was normal [Sclera] : the sclera and conjunctiva were normal [] : no respiratory distress

## 2021-09-15 ENCOUNTER — APPOINTMENT (OUTPATIENT)
Dept: PAIN MANAGEMENT | Facility: CLINIC | Age: 56
End: 2021-09-15
Payer: COMMERCIAL

## 2021-09-15 VITALS
HEIGHT: 69.5 IN | BODY MASS INDEX: 26.78 KG/M2 | WEIGHT: 185 LBS | DIASTOLIC BLOOD PRESSURE: 79 MMHG | HEART RATE: 78 BPM | SYSTOLIC BLOOD PRESSURE: 134 MMHG

## 2021-09-15 PROCEDURE — 99212 OFFICE O/P EST SF 10 MIN: CPT

## 2021-09-15 NOTE — REVIEW OF SYSTEMS
[Fever] : no fever [Chills] : no chills [Chest Pain] : no chest pain [Palpitations] : no palpitations [Shortness Of Breath] : no shortness of breath [Sleep Disturbances] : sleep disturbances [Anxiety] : no anxiety [Depression] : no depression

## 2021-09-15 NOTE — HISTORY OF PRESENT ILLNESS
[FreeTextEntry1] : istop#[id number]\par .Pt continues to have lower back pain for lumbar radiculopathy . \par Denies any new symptoms. \par Sleeps ~ 4 hrs/ night with Ambien. \par Mood has been stable. \par Pt remains active - working full time. \par \par Discussed with pt only taking medical marijuana . \par Pt made aware to avoid alcohol- he denies drinking alcohol. \par

## 2021-09-15 NOTE — ASSESSMENT
[FreeTextEntry1] : No change in meds. \par I will continue to monitor ISTOP .\par RTO 1 month - TEB is ok .\par Plan for December appt with Dr Hall.  [Opioids] : Patient was explained in detail about pain control by using opioids. Patient has signed and fully understands our guidelines for medication and drug screening.  Patient understands the side effects of opioids, including, but not limited to, drug tolerance, dependence, potential for addiction. This class of drugs is habit-forming and EHSAN regulated. The sedative effects of opioids can be potentiated by taking alcohol or any sleeping pills, along with opioids. The decision to drive is patient’s responsibility, as opioids can affect his/her driving ability and ability to concentrate. The long-term place is not clear, however, patient understands that once the pain control optimizes, the goal will be to wean off the opioids. All the issues regarding opioid treatment have been addressed satisfactorily.

## 2021-09-15 NOTE — PHYSICAL EXAM
[General Appearance - Alert] : alert [General Appearance - Well-Appearing] : healthy appearing [Oriented To Time, Place, And Person] : oriented to person, place, and time [Affect] : the affect was normal [Mood] : the mood was normal [Motor Strength] : muscle strength was normal in all four extremities [Motor Strength Lower Extremities Bilaterally] : strength was normal in both lower extremities [2+] : Ankle jerk left 2+ [Sclera] : the sclera and conjunctiva were normal [] : no respiratory distress [Abnormal Walk] : normal gait

## 2021-10-15 ENCOUNTER — RX RENEWAL (OUTPATIENT)
Age: 56
End: 2021-10-15

## 2021-10-18 ENCOUNTER — APPOINTMENT (OUTPATIENT)
Dept: PAIN MANAGEMENT | Facility: CLINIC | Age: 56
End: 2021-10-18

## 2021-11-15 ENCOUNTER — APPOINTMENT (OUTPATIENT)
Dept: PAIN MANAGEMENT | Facility: CLINIC | Age: 56
End: 2021-11-15
Payer: COMMERCIAL

## 2021-11-15 PROCEDURE — 99212 OFFICE O/P EST SF 10 MIN: CPT | Mod: 95

## 2021-11-15 NOTE — ASSESSMENT
[FreeTextEntry1] : Pt has a scheduled appt with Dr Hall on 12/13/21. \par No changes today . \par RTO 1 month \par I will continue to monitor ISTOP .  [Opioids] : Patient was explained in detail about pain control by using opioids. Patient has signed and fully understands our guidelines for medication and drug screening.  Patient understands the side effects of opioids, including, but not limited to, drug tolerance, dependence, potential for addiction. This class of drugs is habit-forming and EHSAN regulated. The sedative effects of opioids can be potentiated by taking alcohol or any sleeping pills, along with opioids. The decision to drive is patient’s responsibility, as opioids can affect his/her driving ability and ability to concentrate. The long-term place is not clear, however, patient understands that once the pain control optimizes, the goal will be to wean off the opioids. All the issues regarding opioid treatment have been addressed satisfactorily.

## 2021-11-15 NOTE — REASON FOR VISIT
[Home] : at home, [unfilled] , at the time of the visit. [Medical Office: ([facility])___] : at the medical office located in  [Verbal consent obtained from patient] : the patient, [unfilled] [Follow-Up: _____] : a [unfilled] follow-up visit

## 2021-11-15 NOTE — HISTORY OF PRESENT ILLNESS
[FreeTextEntry1] : Pt continues to have lower back pain that at times radiates to his groin .\par He continues to remain active. Working fulltime . \par Mood has been stable .\par Pt denies alcohol use. \par Denies any new symptoms.\par \par ISTOP# [id number]\par  \par

## 2021-11-15 NOTE — PHYSICAL EXAM
[General Appearance - Alert] : alert [General Appearance - In No Acute Distress] : in no acute distress [General Appearance - Well-Appearing] : healthy appearing [] : normal voice and communication [Oriented To Time, Place, And Person] : oriented to person, place, and time [Affect] : the affect was normal [Mood] : the mood was normal

## 2021-12-03 ENCOUNTER — RX RENEWAL (OUTPATIENT)
Age: 56
End: 2021-12-03

## 2021-12-13 ENCOUNTER — RX RENEWAL (OUTPATIENT)
Age: 56
End: 2021-12-13

## 2021-12-13 ENCOUNTER — APPOINTMENT (OUTPATIENT)
Dept: PAIN MANAGEMENT | Facility: CLINIC | Age: 56
End: 2021-12-13
Payer: COMMERCIAL

## 2021-12-13 VITALS
SYSTOLIC BLOOD PRESSURE: 145 MMHG | HEIGHT: 69.5 IN | BODY MASS INDEX: 27.94 KG/M2 | DIASTOLIC BLOOD PRESSURE: 86 MMHG | HEART RATE: 65 BPM | WEIGHT: 193 LBS

## 2021-12-13 PROCEDURE — 99214 OFFICE O/P EST MOD 30 MIN: CPT

## 2021-12-13 NOTE — ASSESSMENT
[Opioids] : Patient was explained in detail about pain control by using opioids. Patient has signed and fully understands our guidelines for medication and drug screening.  Patient understands the side effects of opioids, including, but not limited to, drug tolerance, dependence, potential for addiction. This class of drugs is habit-forming and EHSAN regulated. The sedative effects of opioids can be potentiated by taking alcohol or any sleeping pills, along with opioids. The decision to drive is patient’s responsibility, as opioids can affect his/her driving ability and ability to concentrate. The long-term place is not clear, however, patient understands that once the pain control optimizes, the goal will be to wean off the opioids. All the issues regarding opioid treatment have been addressed satisfactorily.  [FreeTextEntry1] : Chronic low back pain Myofascial pain  Currently on Tizanidine 4 mgs 3 qhs, gabapentin 600 mgs 3-4 qd; Vicoprofen 2 in am and one in pm Discussed non opioid options, including MILTON, TPI, Also discussed risks of long term opioid use.

## 2021-12-13 NOTE — PHYSICAL EXAM
[FreeTextEntry1] : Alert and well oriented. No signs of toxicity. \par  \par Lumbar paraspinal tenderness to palpation bilaterally with spasm. Neg SLRT bilaterally

## 2021-12-13 NOTE — HISTORY OF PRESENT ILLNESS
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  Took Vicoprofen  2 in AM. Now it is EUSEBIA 6/10  \par \par Able to function while on opioid therapy. \par

## 2021-12-14 ENCOUNTER — RX RENEWAL (OUTPATIENT)
Age: 56
End: 2021-12-14

## 2022-01-10 ENCOUNTER — APPOINTMENT (OUTPATIENT)
Dept: PAIN MANAGEMENT | Facility: CLINIC | Age: 57
End: 2022-01-10
Payer: COMMERCIAL

## 2022-01-10 PROCEDURE — 99213 OFFICE O/P EST LOW 20 MIN: CPT | Mod: 95

## 2022-01-10 NOTE — HISTORY OF PRESENT ILLNESS
[FreeTextEntry1] : Pt continues to have lower back pain and difficulty sleeping. \par We discussed possibility of MILTON or TPI - pt said " I have not decided yet". \par He continues to work full time.\par Denies alcohol use. \par No signs of constipation . \par Denies adverse effects of current medications.\par Mood reported to be stable.

## 2022-01-10 NOTE — REVIEW OF SYSTEMS
[Chest Pain] : no chest pain [Palpitations] : no palpitations [Constipation] : no constipation [Dizziness] : no dizziness [Fainting] : no fainting

## 2022-01-10 NOTE — REASON FOR VISIT
[Home] : at home, [unfilled] , at the time of the visit. [Other Location: e.g. Home (Enter Location, City,State)___] : at [unfilled] [Spouse] : spouse [Verbal consent obtained from patient] : the patient, [unfilled] [Follow-Up: _____] : a [unfilled] follow-up visit

## 2022-01-10 NOTE — ASSESSMENT
[FreeTextEntry1] : Pt to consider MILTON or TPI\par ISTOP #[id number], we will continue to monitor \par NO changes to day \par RTO 1 month [Opioids] : Patient was explained in detail about pain control by using opioids. Patient has signed and fully understands our guidelines for medication and drug screening.  Patient understands the side effects of opioids, including, but not limited to, drug tolerance, dependence, potential for addiction. This class of drugs is habit-forming and EHSAN regulated. The sedative effects of opioids can be potentiated by taking alcohol or any sleeping pills, along with opioids. The decision to drive is patient’s responsibility, as opioids can affect his/her driving ability and ability to concentrate. The long-term place is not clear, however, patient understands that once the pain control optimizes, the goal will be to wean off the opioids. All the issues regarding opioid treatment have been addressed satisfactorily.

## 2022-02-07 ENCOUNTER — APPOINTMENT (OUTPATIENT)
Dept: PAIN MANAGEMENT | Facility: CLINIC | Age: 57
End: 2022-02-07
Payer: COMMERCIAL

## 2022-02-07 PROCEDURE — 99212 OFFICE O/P EST SF 10 MIN: CPT | Mod: 95

## 2022-02-07 NOTE — ASSESSMENT
[FreeTextEntry1] : RTO 1 month - discuss MILTON / TPI \par I will continue to monitor ISTOP \par  [Opioids] : Patient was explained in detail about pain control by using opioids. Patient has signed and fully understands our guidelines for medication and drug screening.  Patient understands the side effects of opioids, including, but not limited to, drug tolerance, dependence, potential for addiction. This class of drugs is habit-forming and EHSAN regulated. The sedative effects of opioids can be potentiated by taking alcohol or any sleeping pills, along with opioids. The decision to drive is patient’s responsibility, as opioids can affect his/her driving ability and ability to concentrate. The long-term place is not clear, however, patient understands that once the pain control optimizes, the goal will be to wean off the opioids. All the issues regarding opioid treatment have been addressed satisfactorily.

## 2022-02-07 NOTE — HISTORY OF PRESENT ILLNESS
[FreeTextEntry1] : istop#[id number]\par Pt continues to have lower back pain .\par 6/10 .\par Taking meds asa prescribed . \par denies alcohol use. \par remains active .\par Denies any  new symptoms  [____ / 10] : [unfilled]/10 [] : No

## 2022-02-07 NOTE — PHYSICAL EXAM
[General Appearance - Alert] : alert [Affect] : the affect was normal [Mood] : the mood was normal [Sclera] : the sclera and conjunctiva were normal

## 2022-02-07 NOTE — REASON FOR VISIT
[Home] : at home, [unfilled] , at the time of the visit. [Medical Office: ([facility])___] : at the medical office located in  [Verbal consent obtained from patient] : the patient, [unfilled] [Follow-Up: _____] : a [unfilled] follow-up visit

## 2022-02-07 NOTE — REVIEW OF SYSTEMS
[Chest Pain] : no chest pain [Palpitations] : no palpitations [Shortness Of Breath] : no shortness of breath [Back Pain] : ~T back pain

## 2022-03-03 ENCOUNTER — APPOINTMENT (OUTPATIENT)
Dept: PAIN MANAGEMENT | Facility: CLINIC | Age: 57
End: 2022-03-03
Payer: COMMERCIAL

## 2022-03-03 VITALS
DIASTOLIC BLOOD PRESSURE: 77 MMHG | SYSTOLIC BLOOD PRESSURE: 125 MMHG | OXYGEN SATURATION: 98 % | BODY MASS INDEX: 29.03 KG/M2 | HEART RATE: 65 BPM | TEMPERATURE: 97.8 F | HEIGHT: 69 IN | WEIGHT: 196 LBS

## 2022-03-03 DIAGNOSIS — M54.16 RADICULOPATHY, LUMBAR REGION: ICD-10-CM

## 2022-03-03 DIAGNOSIS — M79.18 MYALGIA, OTHER SITE: ICD-10-CM

## 2022-03-03 PROCEDURE — 99213 OFFICE O/P EST LOW 20 MIN: CPT

## 2022-03-03 NOTE — PHYSICAL EXAM
[General Appearance - Alert] : alert [General Appearance - Well-Appearing] : healthy appearing [Oriented To Time, Place, And Person] : oriented to person, place, and time [Affect] : the affect was normal [Mood] : the mood was normal [2+] : Patella left 2+ [Sclera] : the sclera and conjunctiva were normal [] : no respiratory distress

## 2022-03-03 NOTE — HISTORY OF PRESENT ILLNESS
[FreeTextEntry1] : ISTOP# [id number]\par Pt returns today for a followup appt .\par Continues to have lower back pain. \par Discussed considering TPI / MILTON - pt is very open to MILTON - has had succes with it in the past. \par Also discussed smoking cessation and marijuana use ( + in urine ) .\par I reminded patient to avoid " street marijuana " and consider medical marijuana . \par \par Pt denies alcohol use. \par Wife present to day. \par \par Having an infected tooth extracted today .

## 2022-03-03 NOTE — ASSESSMENT
[FreeTextEntry1] : We will continue to monitor ISTOP  and for toxicity . \par RTO 1 month \par \par Dr Hall on site , billed incident to service.

## 2022-04-04 ENCOUNTER — APPOINTMENT (OUTPATIENT)
Dept: PAIN MANAGEMENT | Facility: CLINIC | Age: 57
End: 2022-04-04
Payer: COMMERCIAL

## 2022-04-04 PROCEDURE — 99212 OFFICE O/P EST SF 10 MIN: CPT | Mod: 95

## 2022-05-02 ENCOUNTER — APPOINTMENT (OUTPATIENT)
Dept: PAIN MANAGEMENT | Facility: CLINIC | Age: 57
End: 2022-05-02
Payer: COMMERCIAL

## 2022-05-02 PROCEDURE — 99212 OFFICE O/P EST SF 10 MIN: CPT | Mod: 95

## 2022-05-02 NOTE — REASON FOR VISIT
[Home] : at home, [unfilled] , at the time of the visit. [Medical Office: ([facility])___] : at the medical office located in  [Verbal consent obtained from patient] : the patient, [unfilled] [Follow-Up: _____] : a [unfilled] follow-up visit

## 2022-05-02 NOTE — ASSESSMENT
[FreeTextEntry1] : No changes today . \par RTO 1 month  [Opioids] : Patient was explained in detail about pain control by using opioids. Patient has signed and fully understands our guidelines for medication and drug screening.  Patient understands the side effects of opioids, including, but not limited to, drug tolerance, dependence, potential for addiction. This class of drugs is habit-forming and EHSAN regulated. The sedative effects of opioids can be potentiated by taking alcohol or any sleeping pills, along with opioids. The decision to drive is patient’s responsibility, as opioids can affect his/her driving ability and ability to concentrate. The long-term place is not clear, however, patient understands that once the pain control optimizes, the goal will be to wean off the opioids. All the issues regarding opioid treatment have been addressed satisfactorily.

## 2022-05-02 NOTE — HISTORY OF PRESENT ILLNESS
[FreeTextEntry1] : istop#reviewed and appropriate. \par Pt returns today via TEB .\par Continues  to have lower back pain. \par Discussed with patient avoid alcohol . \par Mood has been stable. \par Pt continues to work full time.  [____ / 10] : [unfilled]/10 [] : No

## 2022-05-02 NOTE — HISTORY OF PRESENT ILLNESS
[FreeTextEntry1] : istop#[id number]\par Pt returns today via TEB .\par Continues  to have lower back pain. \par Discussed with patient avoid alcohol . \par  [____ / 10] : [unfilled]/10 [] : No

## 2022-05-02 NOTE — REASON FOR VISIT
[Home] : at home, [unfilled] , at the time of the visit. [Medical Office: ([facility])___] : at the medical office located in  [Verbal consent obtained from patient] : the patient, [unfilled]

## 2022-06-01 ENCOUNTER — APPOINTMENT (OUTPATIENT)
Dept: PAIN MANAGEMENT | Facility: CLINIC | Age: 57
End: 2022-06-01
Payer: COMMERCIAL

## 2022-06-01 VITALS
WEIGHT: 187 LBS | SYSTOLIC BLOOD PRESSURE: 103 MMHG | DIASTOLIC BLOOD PRESSURE: 65 MMHG | BODY MASS INDEX: 27.7 KG/M2 | HEIGHT: 69 IN | HEART RATE: 71 BPM

## 2022-06-01 PROCEDURE — 99213 OFFICE O/P EST LOW 20 MIN: CPT

## 2022-06-01 NOTE — HISTORY OF PRESENT ILLNESS
[FreeTextEntry1] :  ISTOP#[id number].\par Pt returns today for a followup appt ,\par Continues to have chronic lower back pain . 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 \par Discussed again TPI and MILTON . Pt is considering TPI.   \par \par Pt denies any new symptoms.\par Denies weakness to legs , denies bladder pr bowel dysfunction.\par Mood has been stable , pt denies alcohol  use. \par \par Ambien is helpful for sleep .

## 2022-06-01 NOTE — REVIEW OF SYSTEMS
[Fever] : no fever [Chills] : no chills [Chest Pain] : no chest pain [Palpitations] : no palpitations [Abdominal Pain] : no abdominal pain [Constipation] : no constipation [Dizziness] : no dizziness [Fainting] : no fainting [Sleep Disturbances] : sleep disturbances

## 2022-06-01 NOTE — PHYSICAL EXAM
[General Appearance - Alert] : alert [General Appearance - Well-Appearing] : healthy appearing [Oriented To Time, Place, And Person] : oriented to person, place, and time [Affect] : the affect was normal [Mood] : the mood was normal [Motor Strength] : muscle strength was normal in all four extremities [Motor Strength Lower Extremities Bilaterally] : strength was normal in both lower extremities [2+] : Patella left 2+ [Sclera] : the sclera and conjunctiva were normal [] : no respiratory distress [Abnormal Walk] : normal gait

## 2022-07-05 ENCOUNTER — RX RENEWAL (OUTPATIENT)
Age: 57
End: 2022-07-05

## 2022-07-20 ENCOUNTER — APPOINTMENT (OUTPATIENT)
Dept: PAIN MANAGEMENT | Facility: CLINIC | Age: 57
End: 2022-07-20

## 2022-07-20 VITALS
DIASTOLIC BLOOD PRESSURE: 82 MMHG | SYSTOLIC BLOOD PRESSURE: 126 MMHG | HEIGHT: 69 IN | HEART RATE: 69 BPM | BODY MASS INDEX: 27.4 KG/M2 | WEIGHT: 185 LBS

## 2022-07-20 PROCEDURE — 99214 OFFICE O/P EST MOD 30 MIN: CPT

## 2022-07-20 NOTE — HISTORY OF PRESENT ILLNESS
[FreeTextEntry1] : Since last visit, patient continues to co low back pain non radicular in nature. 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

## 2022-07-20 NOTE — PHYSICAL EXAM
[FreeTextEntry1] : Constitutional: No signs of distress or signs of toxicity. \par Mental Status: Alert and well oriented. Speech fluent. No aphasia. Fund of knowledge intact. \par Psychiatric: Mood stable.\par Cranial Nerve: PERRLA: No papilledema; No VFC: No Butch. V1-3 intact. No facial asymmetry, hearing grossly intact; palate elevates symmetrically, tongue midline\par Motor:No involuntary movements noted.  Adequate bulk, tone throughout, 5/5 strength of all muscle groups \par DTR: present and symmetrical; no clonus, plantars  downgoing\par Sensory: intact to primary and secondary modalities; neg Romberg\par Cerebellar: adequate finger to nose and heel to shin bilaterally.\par Gait: non antalgic or ataxic.\par Eyes: no redness or swelling\par HEENT: intact; no signs of trauma.\par Neck: No masses noted\par Pulmonary: no respiratory distress\par Vascular: no temperature, color change or sudomotor changes.; no edema\par Musculoskeletal: examination of the lumbar spine reveals right sided lumbar paraspinal tenderness to palpation; Range of motion full upon flexion, extension and lateral rotation; negative facet loading, No tenderness of sciatic notch, No tenderness of bilateral greater trochanters, Negative MICHOACANO, negative SLRT bilaterally,\par Skin: No rash.\par

## 2022-07-20 NOTE — ASSESSMENT
[FreeTextEntry1] : Chronic non radicular low back pain exam cw right lumbar myofascial pain syndrome\par Patient will have trigger point injection next visit in hope of decreasing vicoprofen\par ISTOP reviewed [id number].  pharmacy .states last Rx June 28 th vicoprofen was dispensed contrary to ISTOP - [place].\par UDT 2/22 was reviewed. \par \par \par Will provide Rx for vicoprofen

## 2022-09-28 ENCOUNTER — APPOINTMENT (OUTPATIENT)
Dept: PAIN MANAGEMENT | Facility: CLINIC | Age: 57
End: 2022-09-28

## 2022-09-28 VITALS
DIASTOLIC BLOOD PRESSURE: 83 MMHG | SYSTOLIC BLOOD PRESSURE: 133 MMHG | HEART RATE: 88 BPM | BODY MASS INDEX: 27.4 KG/M2 | WEIGHT: 185 LBS | HEIGHT: 69 IN

## 2022-09-28 PROCEDURE — 99214 OFFICE O/P EST MOD 30 MIN: CPT

## 2022-09-28 NOTE — ASSESSMENT
[Opioids] : Patient was explained in detail about pain control by using opioids. Patient has signed and fully understands our guidelines for medication and drug screening.  Patient understands the side effects of opioids, including, but not limited to, drug tolerance, dependence, potential for addiction. This class of drugs is habit-forming and EHSAN regulated. The sedative effects of opioids can be potentiated by taking alcohol or any sleeping pills, along with opioids. The decision to drive is patient’s responsibility, as opioids can affect his/her driving ability and ability to concentrate. The long-term place is not clear, however, patient understands that once the pain control optimizes, the goal will be to wean off the opioids. All the issues regarding opioid treatment have been addressed satisfactorily.  [FreeTextEntry1] : 58 y/o M with chronic myofascial back pain \par \par Discussed in detail the nature of chronic pain as well as chronic opioid use for non-malignant pain. Long term use of opioids are not recommended and can potentially lead to hyperalgesia, tolerance and addiction. Additionally, we extensively discussed the risks possible AE when taking opioids including respiratory suppression and death. We have discussed the importance of exploring nonopioid pain management including PT, therapeutic massage, stretching, exercise program, acupuncture, CBT as well as topical medications, non opioid pain medications, Trigger point injections.\par \par - Recommend TPI L spine \par -Discussed and agreed to taper down opioids but will hold off until TPI \par -Discussed non opioid pain meds but reluctant to increase gabapentin \par -continue Tizanidine \par -Recommended massage and PT but wants to defer. \par \par I am seeing ELIEZER BEGUM as incident to service. Dr. Hall is present in the office suite immediately available and able to provide assistance and direction throughout the time the service was performed.\par

## 2022-09-28 NOTE — HISTORY OF PRESENT ILLNESS
[FreeTextEntry1] : 56 y/o M with Pmhx HTN, chronic lower back pain onset following injury 25 + years ago who presents today for follow up.  Chronic constant daily pain in the lower back, non radicular 7/10 on average, decreased with activity and increased when laying down and resting.  \par \par Current meds include: hydrocodone 7.5/200 3x/day, Tizanidine 4mg 2x/day, Gabapentin 600mg BID, Zolpidem 10 mg q hs, alprazolam prn \par \par He is  and lives in [place] with his wife. he has 2 grown children. Works in construction, owns his own business.  He does not drinks, smokes marijuana 2x/week.

## 2022-09-30 ENCOUNTER — APPOINTMENT (OUTPATIENT)
Dept: PAIN MANAGEMENT | Facility: CLINIC | Age: 57
End: 2022-09-30

## 2022-12-02 ENCOUNTER — APPOINTMENT (OUTPATIENT)
Dept: PAIN MANAGEMENT | Facility: CLINIC | Age: 57
End: 2022-12-02

## 2022-12-02 ENCOUNTER — NON-APPOINTMENT (OUTPATIENT)
Age: 57
End: 2022-12-02

## 2022-12-28 ENCOUNTER — APPOINTMENT (OUTPATIENT)
Dept: PAIN MANAGEMENT | Facility: CLINIC | Age: 57
End: 2022-12-28

## 2022-12-28 ENCOUNTER — NON-APPOINTMENT (OUTPATIENT)
Age: 57
End: 2022-12-28

## 2022-12-28 VITALS
WEIGHT: 190 LBS | HEIGHT: 69 IN | BODY MASS INDEX: 28.14 KG/M2 | SYSTOLIC BLOOD PRESSURE: 119 MMHG | DIASTOLIC BLOOD PRESSURE: 77 MMHG | HEART RATE: 68 BPM | RESPIRATION RATE: 16 BRPM

## 2022-12-28 PROCEDURE — 99213 OFFICE O/P EST LOW 20 MIN: CPT

## 2022-12-28 RX ORDER — NALOXONE HYDROCHLORIDE 4 MG/.1ML
4 SPRAY NASAL
Qty: 1 | Refills: 1 | Status: ACTIVE | COMMUNITY
Start: 2022-12-28 | End: 1900-01-01

## 2022-12-28 NOTE — ASSESSMENT
[FreeTextEntry1] : 56 y/o M with chronic myofascial back pain \par \par Discussed in detail the nature of chronic pain as well as chronic opioid use for non-malignant pain. Long term use of opioids is not recommended and can potentially lead to hyperalgesia, tolerance and addiction. Additionally, we extensively discussed the risks possible AE when taking opioids alone or in conjunction with benzodiazepines, hypnotics and other sedating medications including respiratory suppression and death. We have discussed the importance of exploring nonopioid pain management including PT, therapeutic massage, stretching, exercise program, acupuncture, CBT as well as topical medications, non-opioid pain medications, Trigger point injections, MILTON. The patient had the opportunity to ask questions and all were answered to their satisfaction.  The patient verbalized understanding of the management plan and agreed with our recommendations although very reluctant to try other medications or treatments at this time.  Offered list of external providers but deferred at this time. \par \par - consider TPI L spine but refused.  \par - hydrocodone 7.5/200 decreased from 3x/day to 2-3x/day and will continue to gradually taper\par reluctant to increase gabapentin or try other non-opioid pain meds. \par -continue Tizanidine \par -Recommended massage and PT but wants to defer. \par UDS performed today 12/28/2022 \par I-Stop reviewed,  reference #: [id number]\par No signs of aberrant behavior and will continue to monitor for signs of toxicity.\par Reminded to continue to avoid alcohol.\par Patient denies other prescribers. \par Discussed OD prevention education and prescribed naloxone kit \par Safe storage of medication was reviewed. \par Opiate agreement was renewed \par \par \par \par \par

## 2022-12-28 NOTE — HISTORY OF PRESENT ILLNESS
[FreeTextEntry1] : 58 y/o M with Pmhx HTN, chronic lower back pain onset following injury 25 + years ago who presents today for follow up. Since his last visit he reports no new medical issues.  Overall he continues to report chronic constant daily pain right shoulder, lower back  non radicular 6/10 on average, decreased with activity and increased when laying down and resting.  \par \par Current meds include: hydrocodone 7.5/200 2-3x/day, Tizanidine 4mg 2x/day, Gabapentin 600 mg BID, Zolpidem 10 mg q hs, alprazolam prn \par \par He is  and lives in [place] with his wife. he has 2 grown children. Works in construction, owns his own business.  He does not drinks, smokes marijuana 2x/week.

## 2023-01-06 ENCOUNTER — RX RENEWAL (OUTPATIENT)
Age: 58
End: 2023-01-06

## 2023-03-03 ENCOUNTER — RX RENEWAL (OUTPATIENT)
Age: 58
End: 2023-03-03

## 2023-03-08 ENCOUNTER — APPOINTMENT (OUTPATIENT)
Dept: PAIN MANAGEMENT | Facility: CLINIC | Age: 58
End: 2023-03-08

## 2023-03-29 ENCOUNTER — APPOINTMENT (OUTPATIENT)
Dept: PAIN MANAGEMENT | Facility: CLINIC | Age: 58
End: 2023-03-29
Payer: COMMERCIAL

## 2023-03-29 ENCOUNTER — NON-APPOINTMENT (OUTPATIENT)
Age: 58
End: 2023-03-29

## 2023-03-29 VITALS
HEART RATE: 65 BPM | WEIGHT: 190 LBS | HEIGHT: 69 IN | BODY MASS INDEX: 28.14 KG/M2 | SYSTOLIC BLOOD PRESSURE: 129 MMHG | DIASTOLIC BLOOD PRESSURE: 76 MMHG

## 2023-03-29 PROCEDURE — 99214 OFFICE O/P EST MOD 30 MIN: CPT

## 2023-03-29 RX ORDER — DICLOFENAC SODIUM 75 MG/1
75 TABLET, DELAYED RELEASE ORAL
Qty: 60 | Refills: 3 | Status: ACTIVE | COMMUNITY
Start: 2023-03-29 | End: 1900-01-01

## 2023-03-29 NOTE — ASSESSMENT
[FreeTextEntry1] : 56 y/o M with chronic myofascial back pain \par \par Discussed in detail the nature of chronic pain as well as chronic opioid use for non-malignant pain. Long term use of opioids is not recommended and can potentially lead to hyperalgesia, tolerance and addiction. Additionally, we extensively discussed the risks possible AE when taking opioids alone or in conjunction with benzodiazepines, hypnotics and other sedating medications including respiratory suppression and death. We have discussed the importance of exploring nonopioid pain management including PT, therapeutic massage, stretching, exercise program, acupuncture, CBT as well as topical medications, non-opioid pain medications, Trigger point injections, MILTON. The patient had the opportunity to ask questions and all were answered to their satisfaction.  The patient verbalized understanding of the management plan and agreed with our recommendations although very reluctant to try other medications or treatments at this time.  Offered list of external providers but deferred at this time. \par \par - consider TPI L spine but refused.  \par - hydrocodone 7.5/200 2-3x/day and will continue to gradually taper but summer months are toughest with work. \par -reluctant to increase gabapentin\par - Diclofenac 75 mg 2x/day meals then prn \par  or try other non-opioid pain meds. \par -continue Tizanidine \par -Recommended massage and PT but wants to defer. \par UDS performed today 12/28/2022 \par I-Stop reviewed,  reference #: [id number]\par No signs of aberrant behavior and will continue to monitor for signs of toxicity.\par Reminded to continue to avoid alcohol.\par Patient denies other prescribers. \par Discussed OD prevention education and prescribed naloxone kit \par Safe storage of medication was reviewed. \par Opiate agreement was renewed 12/2022 \par \par \par \par \par

## 2023-03-29 NOTE — HISTORY OF PRESENT ILLNESS
[FreeTextEntry1] : 58 y/o M with Pmhx HTN, chronic lower back pain onset following injury 25 + years ago who presents today for follow up. Since his last visit he reports no new medical issues.  Overall he continues to report chronic constant daily pain right shoulder, lower back  non radicular 6/10 on average, decreased with activity and increased when laying down and resting.  \par \par Current meds include: hydrocodone 7.5/200 2-3x/day, Tizanidine 4mg 2x/day, Gabapentin 600 mg BID, Zolpidem 10 mg q hs, alprazolam prn , Metoprolol, Amlodipine \par \par He is  and lives in [place] with his wife. he has 2 grown children. Works in construction, owns his own business.  He does not drinks, smokes marijuana 2x/week.

## 2023-06-28 ENCOUNTER — APPOINTMENT (OUTPATIENT)
Dept: PAIN MANAGEMENT | Facility: CLINIC | Age: 58
End: 2023-06-28

## 2023-06-28 ENCOUNTER — NON-APPOINTMENT (OUTPATIENT)
Age: 58
End: 2023-06-28

## 2023-06-28 NOTE — HISTORY OF PRESENT ILLNESS
[FreeTextEntry1] : 57 y/o M with Pmhx HTN, chronic lower back pain onset following injury 25 + years ago who presents today for follow up. Since his last visit he reports no new medical issues.  Overall he continues to report chronic constant daily pain right shoulder, lower back  non radicular 6/10 on average, decreased with activity and increased when laying down and resting.  \par \par Current meds include: hydrocodone 7.5/200 2-3x/day, Tizanidine 4mg 2x/day, Gabapentin 600 mg BID, Zolpidem 10 mg q hs, alprazolam prn , Metoprolol, Amlodipine \par \par He is  and lives in [place] with his wife. he has 2 grown children. Works in construction, owns his own business.  He does not drinks, smokes marijuana 2x/week.

## 2023-06-28 NOTE — ASSESSMENT
[FreeTextEntry1] : 59 y/o M with chronic myofascial back pain \par \par Discussed in detail the nature of chronic pain as well as chronic opioid use for non-malignant pain. Long term use of opioids is not recommended and can potentially lead to hyperalgesia, tolerance and addiction. Additionally, we extensively discussed the risks possible AE when taking opioids alone or in conjunction with benzodiazepines, hypnotics and other sedating medications including respiratory suppression and death. We have discussed the importance of exploring nonopioid pain management including PT, therapeutic massage, stretching, exercise program, acupuncture, CBT as well as topical medications, non-opioid pain medications, Trigger point injections, MILTON. The patient had the opportunity to ask questions and all were answered to their satisfaction.  The patient verbalized understanding of the management plan and agreed with our recommendations although very reluctant to try other medications or treatments at this time.  Offered list of external providers but deferred at this time. \par \par - consider TPI L spine but refused.  \par - hydrocodone 7.5/200 2-3x/day and will continue to gradually taper but summer months are toughest with work. \par -reluctant to increase gabapentin\par - Diclofenac 75 mg 2x/day meals then prn \par  or try other non-opioid pain meds. \par -continue Tizanidine \par -Recommended massage and PT but wants to defer. \par UDS performed today 12/28/2022 \par I-Stop reviewed,  reference #: [id number]\par No signs of aberrant behavior and will continue to monitor for signs of toxicity.\par Reminded to continue to avoid alcohol.\par Patient denies other prescribers. \par Discussed OD prevention education and prescribed naloxone kit \par Safe storage of medication was reviewed. \par Opiate agreement was renewed 12/2022 \par \par \par \par \par  [Opioids] : Patient was explained in detail about pain control by using opioids. Patient has signed and fully understands our guidelines for medication and drug screening.  Patient understands the side effects of opioids, including, but not limited to, drug tolerance, dependence, potential for addiction. This class of drugs is habit-forming and EHSAN regulated. The sedative effects of opioids can be potentiated by taking alcohol or any sleeping pills, along with opioids. The decision to drive is patient’s responsibility, as opioids can affect his/her driving ability and ability to concentrate. The long-term place is not clear, however, patient understands that once the pain control optimizes, the goal will be to wean off the opioids. All the issues regarding opioid treatment have been addressed satisfactorily.

## 2023-07-06 ENCOUNTER — APPOINTMENT (OUTPATIENT)
Dept: PAIN MANAGEMENT | Facility: CLINIC | Age: 58
End: 2023-07-06
Payer: COMMERCIAL

## 2023-07-06 VITALS
BODY MASS INDEX: 27.25 KG/M2 | HEIGHT: 69 IN | DIASTOLIC BLOOD PRESSURE: 73 MMHG | HEART RATE: 76 BPM | SYSTOLIC BLOOD PRESSURE: 117 MMHG | WEIGHT: 184 LBS

## 2023-07-06 DIAGNOSIS — R51.9 HEADACHE, UNSPECIFIED: ICD-10-CM

## 2023-07-06 PROCEDURE — 99214 OFFICE O/P EST MOD 30 MIN: CPT

## 2023-07-06 RX ORDER — METHYLPREDNISOLONE 4 MG/1
4 TABLET ORAL
Qty: 1 | Refills: 0 | Status: DISCONTINUED | COMMUNITY
Start: 2018-01-02 | End: 2023-07-06

## 2023-07-06 NOTE — ASSESSMENT
[Opioids] : Patient was explained in detail about pain control by using opioids. Patient has signed and fully understands our guidelines for medication and drug screening.  Patient understands the side effects of opioids, including, but not limited to, drug tolerance, dependence, potential for addiction. This class of drugs is habit-forming and EHSAN regulated. The sedative effects of opioids can be potentiated by taking alcohol or any sleeping pills, along with opioids. The decision to drive is patient’s responsibility, as opioids can affect his/her driving ability and ability to concentrate. The long-term place is not clear, however, patient understands that once the pain control optimizes, the goal will be to wean off the opioids. All the issues regarding opioid treatment have been addressed satisfactorily.  [FreeTextEntry1] : 57 y/o M with chronic myofascial back pain \par \par Again discussed in detail the nature of chronic pain as well as chronic opioid use for non-malignant pain. Long term use of opioids is not recommended and can potentially lead to hyperalgesia, tolerance and addiction. Additionally, we extensively discussed the risks possible AE when taking opioids alone or in conjunction with benzodiazepines, hypnotics and other sedating medications including respiratory suppression and death. We have discussed the importance of exploring nonopioid pain management including PT, therapeutic massage, stretching, exercise program, acupuncture, CBT as well as topical medications, non-opioid pain medications, Trigger point injections, MILTON. \par \par - consider TPI L spine but refused.  \par - hydrocodone 7.5/200 2-3x/day will be decreased to 60 tabs this month and taper monthly to 30 tabs prn.  summer months are toughest with work. \par -reluctant to increase gabapentin  or try other non-opioid pain meds. \par - Diclofenac 75 mg 2x/day meals then prn \par -continue Tizanidine \par -Recommended massage and PT but wants to defer. \par UDS performed 12/28/2022 + for hydrocodone and THC and smoking a few times per week.  Repeated 7/6/2023\par I-Stop reviewed,  reference #: [id number]\par No signs of aberrant behavior and will continue to monitor for signs of toxicity.\par Reminded to continue to avoid alcohol.\par Patient denies other prescribers. \par Discussed OD prevention education and prescribed naloxone kit \par Safe storage of medication was reviewed. \par Opiate agreement was renewed 12/2022 \par \par The patient had the opportunity to ask questions and all were answered to their satisfaction.  The patient verbalized understanding of the management plan and agreed with our recommendations.\par \par \par

## 2023-07-06 NOTE — HISTORY OF PRESENT ILLNESS
[FreeTextEntry1] : 57 y/o M with Pmhx HTN, chronic lower back pain onset following injury 25 + years ago who presents today for follow up. Since his last visit he reports no new medical issues.  Overall he continues to report chronic constant daily pain right shoulder, lower back  non radicular 6/10 on average, decreased with activity and increased when laying down and resting.  \par \par Current meds include: hydrocodone 7.5/200 2-3x/day, Tizanidine 4mg 2x/day, Gabapentin 600 mg BID, Zolpidem 10 mg q hs, alprazolam prn , Metoprolol, Amlodipine Diclofenac \par \par He is  and lives in [place] with his wife. he has 2 grown children. Works in construction, owns his own business.  He does not drinks, smokes marijuana 2x/week.

## 2023-08-18 ENCOUNTER — RX RENEWAL (OUTPATIENT)
Age: 58
End: 2023-08-18

## 2023-08-27 ENCOUNTER — RX RENEWAL (OUTPATIENT)
Age: 58
End: 2023-08-27

## 2023-10-10 ENCOUNTER — APPOINTMENT (OUTPATIENT)
Dept: PAIN MANAGEMENT | Facility: CLINIC | Age: 58
End: 2023-10-10

## 2023-10-20 ENCOUNTER — NON-APPOINTMENT (OUTPATIENT)
Age: 58
End: 2023-10-20

## 2023-10-20 ENCOUNTER — APPOINTMENT (OUTPATIENT)
Dept: PAIN MANAGEMENT | Facility: CLINIC | Age: 58
End: 2023-10-20
Payer: COMMERCIAL

## 2023-10-20 VITALS
HEART RATE: 59 BPM | SYSTOLIC BLOOD PRESSURE: 121 MMHG | BODY MASS INDEX: 27.4 KG/M2 | HEIGHT: 69 IN | DIASTOLIC BLOOD PRESSURE: 77 MMHG | WEIGHT: 185 LBS

## 2023-10-20 DIAGNOSIS — G89.4 CHRONIC PAIN SYNDROME: ICD-10-CM

## 2023-10-20 PROCEDURE — 99213 OFFICE O/P EST LOW 20 MIN: CPT

## 2024-03-26 ENCOUNTER — LABORATORY RESULT (OUTPATIENT)
Age: 59
End: 2024-03-26

## 2024-03-26 ENCOUNTER — APPOINTMENT (OUTPATIENT)
Dept: PAIN MANAGEMENT | Facility: CLINIC | Age: 59
End: 2024-03-26
Payer: COMMERCIAL

## 2024-03-26 VITALS
BODY MASS INDEX: 27.7 KG/M2 | WEIGHT: 187 LBS | DIASTOLIC BLOOD PRESSURE: 75 MMHG | HEART RATE: 60 BPM | SYSTOLIC BLOOD PRESSURE: 144 MMHG | HEIGHT: 69 IN

## 2024-03-26 PROCEDURE — G2211 COMPLEX E/M VISIT ADD ON: CPT

## 2024-03-26 PROCEDURE — 99214 OFFICE O/P EST MOD 30 MIN: CPT

## 2024-03-26 RX ORDER — TIZANIDINE 4 MG/1
4 TABLET ORAL
Qty: 270 | Refills: 3 | Status: ACTIVE | COMMUNITY
Start: 2019-12-19 | End: 1900-01-01

## 2024-03-26 NOTE — ASSESSMENT
[Opioids] : Patient was explained in detail about pain control by using opioids. Patient has signed and fully understands our guidelines for medication and drug screening.  Patient understands the side effects of opioids, including, but not limited to, drug tolerance, dependence, potential for addiction. This class of drugs is habit-forming and EHSAN regulated. The sedative effects of opioids can be potentiated by taking alcohol or any sleeping pills, along with opioids. The decision to drive is patient's responsibility, as opioids can affect his/her driving ability and ability to concentrate. The long-term place is not clear, however, patient understands that once the pain control optimizes, the goal will be to wean off the opioids. All the issues regarding opioid treatment have been addressed satisfactorily.  [FreeTextEntry1] : 57 y/o M with chronic myofascial back pain  - hydrocodone 7.5/200 2-3x/day will be decreased to 60 tabs this month and taper monthly to 30 tabs prn. summer months are toughest with work. -reluctant to increase gabapentin or try other non-opioid pain meds. - Diclofenac 75 mg 2x/day meals then prn -continue Tizanidine -Recommended massage and PT but wants to defer. UDS performed 3/26/2024 I-Stop reviewed, reference #: [id number] No signs of aberrant behavior and will continue to monitor for signs of toxicity. Reminded to continue to avoid alcohol. Patient denies other prescribers. Discussed OD prevention education and prescribed naloxone kit Safe storage of medication was reviewed. Opiate agreement was renewed 12/2022  The patient had the opportunity to ask questions and all were answered to their satisfaction. The patient verbalized understanding of the management plan and agreed with our recommendations.

## 2024-03-26 NOTE — HISTORY OF PRESENT ILLNESS
[FreeTextEntry1] : 59 y/o M with Pmhx HTN, chronic lower back pain onset following injury 25 + years ago who presents today for follow up. Since his last visit he reports no new medical issues.  Overall he continues to report chronic constant daily pain right shoulder, lower back non radicular 6/10 on average, decreased with activity and increased when laying down and resting.    Current meds include: hydrocodone 7.5/200 2-3x/day, Tizanidine 4mg 2x/day, Gabapentin 600 mg BID, Zolpidem 10 mg q hs, alprazolam prn , Metoprolol, Amlodipine Diclofenac   He is  and lives in [place] with his wife. he has 2 grown children. Works in construction, owns his own business.  He does not drinks, smokes marijuana 2x/week.

## 2024-04-03 LAB
AMPHET UR-MCNC: NEGATIVE NG/ML
BARBITURATES UR-MCNC: NEGATIVE NG/ML
BENZODIAZ UR-MCNC: NEGATIVE NG/ML
COCAINE METAB.OTHER UR-MCNC: NEGATIVE NG/ML
CREATININE, URINE: 157.3 MG/DL
FENTANYL, URINE: NEGATIVE NG/ML
METHADONE UR-MCNC: NEGATIVE NG/ML
OPIATES UR-MCNC: NORMAL NG/ML
OXYCODONE/OXYMORPHONE, URINE: NEGATIVE NG/ML
PCP UR-MCNC: NEGATIVE NG/ML
PH, URINE: 5.1
PLEASE NOTE: DRUGSCRUR: NORMAL
THC UR QL: NORMAL NG/ML

## 2024-09-06 ENCOUNTER — APPOINTMENT (OUTPATIENT)
Dept: PAIN MANAGEMENT | Facility: CLINIC | Age: 59
End: 2024-09-06

## 2024-09-16 ENCOUNTER — APPOINTMENT (OUTPATIENT)
Dept: PAIN MANAGEMENT | Facility: CLINIC | Age: 59
End: 2024-09-16
Payer: COMMERCIAL

## 2024-09-16 ENCOUNTER — LABORATORY RESULT (OUTPATIENT)
Age: 59
End: 2024-09-16

## 2024-09-16 ENCOUNTER — NON-APPOINTMENT (OUTPATIENT)
Age: 59
End: 2024-09-16

## 2024-09-16 VITALS
SYSTOLIC BLOOD PRESSURE: 137 MMHG | HEART RATE: 60 BPM | WEIGHT: 188 LBS | HEIGHT: 69 IN | DIASTOLIC BLOOD PRESSURE: 81 MMHG | BODY MASS INDEX: 27.85 KG/M2

## 2024-09-16 PROCEDURE — 99214 OFFICE O/P EST MOD 30 MIN: CPT

## 2024-09-16 PROCEDURE — G2211 COMPLEX E/M VISIT ADD ON: CPT | Mod: NC

## 2024-09-16 NOTE — ASSESSMENT
[FreeTextEntry1] : 60 y/o M with chronic myofascial back pain  - hydrocodone 7.5/200 2-3x/day will be decreased to 60 tabs this month and taper monthly to 30 tabs prn. summer months are toughest with work. -reluctant to increase gabapentin or try other non-opioid pain meds. - Diclofenac 75 mg 2x/day meals then prn -continue Tizanidine -Recommended massage and PT but wants to defer. UDS performed 9/16/2024 I-Stop reviewed, reference #: [id number] No signs of aberrant behavior and will continue to monitor for signs of toxicity. Reminded to continue to avoid alcohol. Patient denies other prescribers. Discussed OD prevention education and prescribed naloxone kit Safe storage of medication was reviewed. Opiate agreement was renewed 12/2022  The patient had the opportunity to ask questions and all were answered to their satisfaction. The patient verbalized understanding of the management plan and agreed with our recommendations.

## 2024-09-16 NOTE — HISTORY OF PRESENT ILLNESS
[FreeTextEntry1] : 58 y/o M with Pmhx HTN, chronic lower back pain onset following injury 25 + years ago who presents today for follow up. Since his last visit he reports no new medical issues.  Overall he continues to report chronic constant daily pain right shoulder, lower back non radicular 6/10 on average, decreased with activity and increased when laying down and resting.  Diffuse aches and pain, getting massage regularly which helps.    Prior meds: Duloxetine,  Current meds include: hydrocodone 7.5/200 2-3x/day, Tizanidine 4mg 2x/day, Gabapentin 600 mg BID, Zolpidem 10 mg q hs, alprazolam prn , Metoprolol, Amlodipine Diclofenac   He is  and lives in [place] with his wife. he has 2 grown children. Works in construction, owns his own business.  He does not drinks, smokes marijuana 2x/week.

## 2024-09-23 LAB
AMPHET UR-MCNC: NEGATIVE NG/ML
BARBITURATES UR-MCNC: NEGATIVE NG/ML
BENZODIAZ UR-MCNC: NEGATIVE NG/ML
COCAINE METAB.OTHER UR-MCNC: NEGATIVE NG/ML
CREATININE, URINE: 209.8 MG/DL
FENTANYL, URINE: NEGATIVE NG/ML
METHADONE SCREEN, UR: NEGATIVE NG/ML
OPIATES UR-MCNC: NORMAL NG/ML
OXYCODONE/OXYMORPHONE, URINE: NEGATIVE NG/ML
PCP UR-MCNC: NEGATIVE NG/ML
PH, URINE: 5.4
THC UR QL: NORMAL NG/ML

## 2024-11-01 ENCOUNTER — APPOINTMENT (OUTPATIENT)
Dept: PAIN MANAGEMENT | Facility: CLINIC | Age: 59
End: 2024-11-01

## 2025-01-09 ENCOUNTER — NON-APPOINTMENT (OUTPATIENT)
Age: 60
End: 2025-01-09

## 2025-02-10 ENCOUNTER — RX RENEWAL (OUTPATIENT)
Age: 60
End: 2025-02-10

## 2025-02-25 ENCOUNTER — APPOINTMENT (OUTPATIENT)
Dept: PAIN MANAGEMENT | Facility: CLINIC | Age: 60
End: 2025-02-25
Payer: COMMERCIAL

## 2025-02-25 VITALS
SYSTOLIC BLOOD PRESSURE: 133 MMHG | BODY MASS INDEX: 27.32 KG/M2 | DIASTOLIC BLOOD PRESSURE: 77 MMHG | WEIGHT: 185 LBS | HEART RATE: 65 BPM

## 2025-02-25 PROCEDURE — 99214 OFFICE O/P EST MOD 30 MIN: CPT

## 2025-05-01 ENCOUNTER — NON-APPOINTMENT (OUTPATIENT)
Age: 60
End: 2025-05-01

## 2025-07-25 ENCOUNTER — NON-APPOINTMENT (OUTPATIENT)
Age: 60
End: 2025-07-25

## 2025-08-28 ENCOUNTER — NON-APPOINTMENT (OUTPATIENT)
Age: 60
End: 2025-08-28

## 2025-08-29 ENCOUNTER — LABORATORY RESULT (OUTPATIENT)
Age: 60
End: 2025-08-29

## 2025-08-29 ENCOUNTER — APPOINTMENT (OUTPATIENT)
Dept: PAIN MANAGEMENT | Facility: CLINIC | Age: 60
End: 2025-08-29
Payer: COMMERCIAL

## 2025-08-29 VITALS
OXYGEN SATURATION: 97 % | DIASTOLIC BLOOD PRESSURE: 72 MMHG | HEART RATE: 67 BPM | BODY MASS INDEX: 26.48 KG/M2 | RESPIRATION RATE: 20 BRPM | HEIGHT: 70 IN | SYSTOLIC BLOOD PRESSURE: 129 MMHG | WEIGHT: 185 LBS

## 2025-08-29 PROCEDURE — 99214 OFFICE O/P EST MOD 30 MIN: CPT

## 2025-09-05 LAB
AMPHET UR-MCNC: NEGATIVE NG/ML
BARBITURATES UR-MCNC: NEGATIVE NG/ML
BENZODIAZ UR-MCNC: NEGATIVE NG/ML
COCAINE METAB.OTHER UR-MCNC: NEGATIVE NG/ML
CREATININE, URINE: 344.5 MG/DL
FENTANYL, URINE: NEGATIVE NG/ML
METHADONE SCREEN, UR: NEGATIVE NG/ML
OPIATES UR-MCNC: NORMAL NG/ML
OXYCODONE/OXYMORPHONE, URINE: NEGATIVE NG/ML
PCP UR-MCNC: NEGATIVE NG/ML
PH, URINE: 5.5
THC UR QL: NORMAL NG/ML

## 2025-09-18 ENCOUNTER — NON-APPOINTMENT (OUTPATIENT)
Age: 60
End: 2025-09-18